# Patient Record
Sex: MALE | Race: OTHER
[De-identification: names, ages, dates, MRNs, and addresses within clinical notes are randomized per-mention and may not be internally consistent; named-entity substitution may affect disease eponyms.]

---

## 2021-03-28 ENCOUNTER — HOSPITAL ENCOUNTER (EMERGENCY)
Dept: HOSPITAL 72 - EMR | Age: 56
Discharge: HOME | End: 2021-03-28
Payer: MEDICAID

## 2021-03-28 VITALS — BODY MASS INDEX: 26.52 KG/M2 | HEIGHT: 68 IN | WEIGHT: 175 LBS

## 2021-03-28 VITALS — SYSTOLIC BLOOD PRESSURE: 139 MMHG | DIASTOLIC BLOOD PRESSURE: 87 MMHG

## 2021-03-28 DIAGNOSIS — F41.9: Primary | ICD-10-CM

## 2021-03-28 DIAGNOSIS — F12.90: ICD-10-CM

## 2021-03-28 DIAGNOSIS — Z79.899: ICD-10-CM

## 2021-03-28 LAB
ADD MANUAL DIFF: NO
ALBUMIN SERPL-MCNC: 4.2 G/DL (ref 3.4–5)
ALBUMIN/GLOB SERPL: 1.2 {RATIO} (ref 1–2.7)
ALP SERPL-CCNC: 57 U/L (ref 46–116)
ALT SERPL-CCNC: 69 U/L (ref 12–78)
ANION GAP SERPL CALC-SCNC: 13 MMOL/L (ref 5–15)
APPEARANCE UR: CLEAR
APTT BLD: 26 SEC (ref 23–33)
APTT PPP: (no result) S
AST SERPL-CCNC: 26 U/L (ref 15–37)
BASOPHILS NFR BLD AUTO: 1.3 % (ref 0–2)
BILIRUB SERPL-MCNC: 0.7 MG/DL (ref 0.2–1)
BUN SERPL-MCNC: 15 MG/DL (ref 7–18)
CALCIUM SERPL-MCNC: 9.4 MG/DL (ref 8.5–10.1)
CHLORIDE SERPL-SCNC: 102 MMOL/L (ref 98–107)
CO2 SERPL-SCNC: 25 MMOL/L (ref 21–32)
CREAT SERPL-MCNC: 1.1 MG/DL (ref 0.55–1.3)
EOSINOPHIL NFR BLD AUTO: 1.6 % (ref 0–3)
ERYTHROCYTE [DISTWIDTH] IN BLOOD BY AUTOMATED COUNT: 11.2 % (ref 11.6–14.8)
GLOBULIN SER-MCNC: 3.5 G/DL
GLUCOSE UR STRIP-MCNC: NEGATIVE MG/DL
HCT VFR BLD CALC: 50.2 % (ref 42–52)
HGB BLD-MCNC: 17 G/DL (ref 14.2–18)
INR PPP: 1 (ref 0.9–1.1)
KETONES UR QL STRIP: NEGATIVE
LEUKOCYTE ESTERASE UR QL STRIP: NEGATIVE
LYMPHOCYTES NFR BLD AUTO: 34.1 % (ref 20–45)
MCV RBC AUTO: 90 FL (ref 80–99)
MONOCYTES NFR BLD AUTO: 5.8 % (ref 1–10)
NEUTROPHILS NFR BLD AUTO: 57.2 % (ref 45–75)
NITRITE UR QL STRIP: NEGATIVE
PH UR STRIP: 6.5 [PH] (ref 4.5–8)
PLATELET # BLD: 180 K/UL (ref 150–450)
POTASSIUM SERPL-SCNC: 4 MMOL/L (ref 3.5–5.1)
PROT UR QL STRIP: NEGATIVE
RBC # BLD AUTO: 5.57 M/UL (ref 4.7–6.1)
SODIUM SERPL-SCNC: 140 MMOL/L (ref 136–145)
SP GR UR STRIP: 1.01 (ref 1–1.03)
UROBILINOGEN UR-MCNC: NORMAL MG/DL (ref 0–1)
WBC # BLD AUTO: 5.6 K/UL (ref 4.8–10.8)

## 2021-03-28 PROCEDURE — 85025 COMPLETE CBC W/AUTO DIFF WBC: CPT

## 2021-03-28 PROCEDURE — 85379 FIBRIN DEGRADATION QUANT: CPT

## 2021-03-28 PROCEDURE — 80053 COMPREHEN METABOLIC PANEL: CPT

## 2021-03-28 PROCEDURE — 81003 URINALYSIS AUTO W/O SCOPE: CPT

## 2021-03-28 PROCEDURE — 83880 ASSAY OF NATRIURETIC PEPTIDE: CPT

## 2021-03-28 PROCEDURE — 71045 X-RAY EXAM CHEST 1 VIEW: CPT

## 2021-03-28 PROCEDURE — 99284 EMERGENCY DEPT VISIT MOD MDM: CPT

## 2021-03-28 PROCEDURE — 85610 PROTHROMBIN TIME: CPT

## 2021-03-28 PROCEDURE — 85730 THROMBOPLASTIN TIME PARTIAL: CPT

## 2021-03-28 PROCEDURE — 84443 ASSAY THYROID STIM HORMONE: CPT

## 2021-03-28 PROCEDURE — 70450 CT HEAD/BRAIN W/O DYE: CPT

## 2021-03-28 PROCEDURE — 84484 ASSAY OF TROPONIN QUANT: CPT

## 2021-03-28 PROCEDURE — 36415 COLL VENOUS BLD VENIPUNCTURE: CPT

## 2021-03-28 PROCEDURE — 80307 DRUG TEST PRSMV CHEM ANLYZR: CPT

## 2021-03-28 NOTE — DIAGNOSTIC IMAGING REPORT
History: CP

 

Exam: XR CXR 1 VIEW

 

Comparison: 2/9/2011

 

FINDINGS:

 

The lungs are clear.  The cardiac and mediastinal contours are within 

limits.  The visualized osseous structures appear within limits.

 

IMPRESSION:

 

No evidence of acute disease.

## 2021-03-28 NOTE — EMERGENCY ROOM REPORT
History of Present Illness


General


Chief Complaint:  insomnia


Source:  Patient





Present Illness


HPI


Patient is a 55-year-old male presents for increased difficulty with sleeping as

well as chest discomfort.  He reports having increased anxiety over the past few

months.  States has been ongoing for  approximately 1 year.  Reports having 

associated shakiness.  Had been taking Elavil for sleep as well as medications 

for cholesterol.  Patient reports having no prior history of recent alcohol 

abuse.  Patient states several months ago he had been drinking heavily but had 

not any alcohol for several weeks.  Patient denies any vomiting or diarrhea.  

Reports having increased feelings of nervousness.  Denies any suicidal thoughts.

 Patient had not been having any leg pain or swelling.  No change with exertion.

 Previous negative coronavirus testing performed.  No prior cardiac history. not

a smoker.


Allergies:  


Coded Allergies:  


     No Known Allergies (Unverified , 3/6/14)





COVID-19 Screening


Contact w/high risk pt:  No


Experienced COVID-19 symptoms?:  No


COVID-19 Testing performed PTA:  No





Patient History


Reviewed Nursing Documentation:  PMH: Agreed; PSxH: Agreed





Nursing Documentation-PMH


Hx Gastrointestinal Problems:  Yes - gastritis, constipation





Review of Systems


All Other Systems:  negative except mentioned in HPI





Physical Exam





Vital Signs








  Date Time  Temp Pulse Resp B/P (MAP) Pulse Ox O2 Delivery O2 Flow Rate FiO2


 


3/28/21 16:42 97.9 70 18 139/87 (104) 98 Room Air  








Sp02 EP Interpretation:  reviewed, normal


General Appearance:  normal inspection, well appearing, no apparent distress, 

alert, GCS 15, non-toxic


Head:  normocephalic, atraumatic


ENT:  normal ENT inspection, hearing grossly normal, normal voice


Neck:  normal inspection, full range of motion, supple, no bony tend


Respiratory:  normal inspection, lungs clear, normal breath sounds, no 

respiratory distress, no retraction, no wheezing


Cardiovascular #1:  regular rate, rhythm, no edema


Gastrointestinal:  normal inspection, normal bowel sounds, non tender, soft, no 

guarding, no hernia


Genitourinary:  no CVA tenderness


Musculoskeletal:  normal inspection, back normal, normal range of motion


Neurologic:  alert, motor strength/tone normal, CNs III-XII nml as tested, 

oriented x3, responsive, speech normal, normal inspection


Psychiatric:  normal inspection, judgement/insight normal, mood/affect normal





Medical Decision Making


Diagnostic Impression:  


   Primary Impression:  


   Anxiety


   Additional Impression:  


   Marijuana use


ER Course


Patient presents for increased anxiety and insomnia.  Differential diagnosis 

includes not limited to alcohol withdrawal, hyperthyroidism, anxiety, bipolar 

disorder among others.  Because of complexity of patient's case laboratory tests

and imaging studies were ordered.  Patient's laboratory testing was essentially 

unremarkable.  He was given Pepcid due to some sensation of acid reflux.  

Patient states that he had taken an oral  marijuana edible.  Patient states he 

does not use marijuana regularly.  CT imaging of the head read by radiologist no

acute process.  Laboratory testing did not show any evidence of elevated D-dimer

elevated troponin.  Chest x-ray was unremarkable read by radiology.  Patient's 

symptoms appear to be anxiety related.  He was advised to follow-up with his 

primary care physician and/or psychiatrist for recheck.  Patient was advised to 

seek emergency care if he had any worsening of condition or any other concerns. 

This medical record is generated with Dragon transcription software. There may 

be some transcription discrepancies related to use of this software





Labs








Test


 3/28/21


17:00


 


White Blood Count


 5.6 K/UL


(4.8-10.8)


 


Red Blood Count


 5.57 M/UL


(4.70-6.10)


 


Hemoglobin


 17.0 G/DL


(14.2-18.0)


 


Hematocrit


 50.2 %


(42.0-52.0)


 


Mean Corpuscular Volume 90 FL (80-99) 


 


Mean Corpuscular Hemoglobin


 30.5 PG


(27.0-31.0)


 


Mean Corpuscular Hemoglobin


Concent 33.8 G/DL


(32.0-36.0)


 


Red Cell Distribution Width


 11.2 %


(11.6-14.8)


 


Platelet Count


 180 K/UL


(150-450)


 


Mean Platelet Volume


 8.2 FL


(6.5-10.1)


 


Neutrophils (%) (Auto)


 57.2 %


(45.0-75.0)


 


Lymphocytes (%) (Auto)


 34.1 %


(20.0-45.0)


 


Monocytes (%) (Auto)


 5.8 %


(1.0-10.0)


 


Eosinophils (%) (Auto)


 1.6 %


(0.0-3.0)


 


Basophils (%) (Auto)


 1.3 %


(0.0-2.0)


 


Prothrombin Time


 11.3 SEC


(9.30-11.50)


 


Prothromb Time International


Ratio 1.0 (0.9-1.1) 





 


Activated Partial


Thromboplast Time 26 SEC (23-33) 





 


D-Dimer


 < 0.19 mg/L


FEU


 


Urine Color Pale yellow 


 


Urine Appearance Clear 


 


Urine pH 6.5 (4.5-8.0) 


 


Urine Specific Gravity


 1.010


(1.005-1.035)


 


Urine Protein


 Negative


(NEGATIVE)


 


Urine Glucose (UA)


 Negative


(NEGATIVE)


 


Urine Ketones


 Negative


(NEGATIVE)


 


Urine Blood


 Negative


(NEGATIVE)


 


Urine Nitrite


 Negative


(NEGATIVE)


 


Urine Bilirubin


 Negative


(NEGATIVE)


 


Urine Urobilinogen


 Normal MG/DL


(0.0-1.0)


 


Urine Leukocyte Esterase


 Negative


(NEGATIVE)


 


Sodium Level


 140 MMOL/L


(136-145)


 


Potassium Level


 4.0 MMOL/L


(3.5-5.1)


 


Chloride Level


 102 MMOL/L


()


 


Carbon Dioxide Level


 25 MMOL/L


(21-32)


 


Anion Gap


 13 mmol/L


(5-15)


 


Blood Urea Nitrogen


 15 mg/dL


(7-18)


 


Creatinine


 1.1 MG/DL


(0.55-1.30)


 


Estimat Glomerular Filtration


Rate > 60 mL/min


(>60)


 


Glucose Level


 122 MG/DL


()


 


Calcium Level


 9.4 MG/DL


(8.5-10.1)


 


Total Bilirubin


 0.7 MG/DL


(0.2-1.0)


 


Aspartate Amino Transf


(AST/SGOT) 26 U/L (15-37) 





 


Alanine Aminotransferase


(ALT/SGPT) 69 U/L (12-78) 





 


Alkaline Phosphatase


 57 U/L


()


 


Troponin I


 0.018 ng/mL


(0.000-0.056)


 


Pro-B-Type Natriuretic Peptide


 32 pg/mL


(0-125)


 


Total Protein


 7.7 G/DL


(6.4-8.2)


 


Albumin


 4.2 G/DL


(3.4-5.0)


 


Globulin 3.5 g/dL 


 


Albumin/Globulin Ratio 1.2 (1.0-2.7) 


 


Thyroid Stimulating Hormone


(TSH) 1.050 uiU/mL


(0.358-3.740)


 


Urine Opiates Screen


 Negative


(NEGATIVE)


 


Urine Barbiturates Screen


 Negative


(NEGATIVE)


 


Phencyclidine (PCP) Screen


 Negative


(NEGATIVE)


 


Urine Amphetamines Screen


 Negative


(NEGATIVE)


 


Urine Benzodiazepines Screen


 Negative


(NEGATIVE)


 


Urine Cocaine Screen


 Negative


(NEGATIVE)


 


Urine Marijuana (THC) Screen


 Positive


(NEGATIVE)








EKG Diagnostic Results


Rate:  normal


Rhythm:  NSR


ST Segments:  no acute changes





Last Vital Signs








  Date Time  Temp Pulse Resp B/P (MAP) Pulse Ox O2 Delivery O2 Flow Rate FiO2


 


3/28/21 16:42 97.9 70 18 139/87 (104) 98 Room Air  








Status:  improved


Disposition:  HOME, SELF-CARE


Condition:  Stable


Scripts


Omeprazole (OMEPRAZOLE) 20 Mg Capsule.dr


20 MG ORAL DAILY for Gerd, #30 CAP


   Prov: Imer Malone MD         3/28/21 


Quetiapine Fumarate* (SEROQUEL*) 25 Mg Tablet


25 MG ORAL QHS, #30 TAB


   Prov: Imer Malone MD         3/28/21











Imer Malone MD               Mar 28, 2021 16:52

## 2021-03-28 NOTE — DIAGNOSTIC IMAGING REPORT
EXAM:

  CT Head Without Intravenous Contrast

 

CLINICAL HISTORY:

  PAIN

 

TECHNIQUE:

  Axial computed tomography images of the head/brain without intravenous 

contrast.  CTDI is 53.4 mGy and DLP is 938.7 mGy-cm.  One or more of the 

following dose reduction techniques were used: automated exposure control,

 adjustment of the mA and/or kV according to patient size, use of 

iterative reconstruction technique.

 

COMPARISON:

  2/9/2011

 

FINDINGS:

  Brain:  Unremarkable.  No hemorrhage.  No significant white matter 

disease.  No edema.

  Ventricles:  Unremarkable.  No ventriculomegaly.

  Bones/joints:  Unremarkable.  No acute fracture.

  Soft tissues:  Unremarkable.

  Sinuses:  Unremarkable as visualized.  No acute sinusitis.

  Mastoid air cells:  Unremarkable as visualized.  No mastoid effusion.

 

IMPRESSION:     

  Normal head/brain CT.